# Patient Record
Sex: MALE | Race: WHITE | NOT HISPANIC OR LATINO | Employment: OTHER | URBAN - METROPOLITAN AREA
[De-identification: names, ages, dates, MRNs, and addresses within clinical notes are randomized per-mention and may not be internally consistent; named-entity substitution may affect disease eponyms.]

---

## 2017-05-18 DIAGNOSIS — R91.1 PULMONARY NODULE: Primary | ICD-10-CM

## 2017-05-22 ENCOUNTER — HOSPITAL ENCOUNTER (OUTPATIENT)
Dept: RADIOLOGY | Facility: HOSPITAL | Age: 67
Discharge: HOME | End: 2017-05-22
Attending: NURSE PRACTITIONER
Payer: MEDICARE

## 2017-05-22 DIAGNOSIS — R91.1 PULMONARY NODULE: ICD-10-CM

## 2017-05-22 PROCEDURE — 78815 PET IMAGE W/CT SKULL-THIGH: CPT | Mod: 26,PI,, | Performed by: RADIOLOGY

## 2017-05-22 PROCEDURE — A9552 F18 FDG: HCPCS

## 2021-08-25 ENCOUNTER — HOSPITAL ENCOUNTER (EMERGENCY)
Facility: HOSPITAL | Age: 71
Discharge: HOME | End: 2021-08-25
Attending: EMERGENCY MEDICINE
Payer: MEDICARE

## 2021-08-25 VITALS
BODY MASS INDEX: 26.07 KG/M2 | RESPIRATION RATE: 19 BRPM | WEIGHT: 172 LBS | HEIGHT: 68 IN | OXYGEN SATURATION: 97 % | DIASTOLIC BLOOD PRESSURE: 65 MMHG | HEART RATE: 57 BPM | SYSTOLIC BLOOD PRESSURE: 121 MMHG | TEMPERATURE: 99 F

## 2021-08-25 DIAGNOSIS — M54.2 NECK PAIN: ICD-10-CM

## 2021-08-25 DIAGNOSIS — M54.9 BACK PAIN: ICD-10-CM

## 2021-08-25 DIAGNOSIS — M25.551 RIGHT HIP PAIN: ICD-10-CM

## 2021-08-25 DIAGNOSIS — R07.89 CHEST WALL PAIN: ICD-10-CM

## 2021-08-25 DIAGNOSIS — S00.93XA CONTUSION OF HEAD, UNSPECIFIED PART OF HEAD, INITIAL ENCOUNTER: Primary | ICD-10-CM

## 2021-08-25 DIAGNOSIS — M25.511 RIGHT SHOULDER PAIN: ICD-10-CM

## 2021-08-25 PROCEDURE — 25000003 PHARM REV CODE 250: Performed by: EMERGENCY MEDICINE

## 2021-08-25 PROCEDURE — 99284 EMERGENCY DEPT VISIT MOD MDM: CPT

## 2021-08-25 RX ORDER — HYDROCODONE BITARTRATE AND ACETAMINOPHEN 10; 325 MG/1; MG/1
1 TABLET ORAL
Status: COMPLETED | OUTPATIENT
Start: 2021-08-25 | End: 2021-08-25

## 2021-08-25 RX ORDER — LIDOCAINE 50 MG/G
1 PATCH TOPICAL DAILY
Qty: 5 PATCH | Refills: 0 | Status: SHIPPED | OUTPATIENT
Start: 2021-08-25 | End: 2023-10-02

## 2021-08-25 RX ORDER — METHOCARBAMOL 750 MG/1
1500 TABLET, FILM COATED ORAL 3 TIMES DAILY
Qty: 30 TABLET | Refills: 0 | Status: SHIPPED | OUTPATIENT
Start: 2021-08-25 | End: 2021-08-30

## 2021-08-25 RX ORDER — LIDOCAINE 50 MG/G
1 PATCH TOPICAL
Status: DISCONTINUED | OUTPATIENT
Start: 2021-08-25 | End: 2021-08-25 | Stop reason: HOSPADM

## 2021-08-25 RX ORDER — AZITHROMYCIN 250 MG/1
250 TABLET, FILM COATED ORAL DAILY
Qty: 6 TABLET | Refills: 0 | Status: SHIPPED | OUTPATIENT
Start: 2021-08-25 | End: 2023-10-02

## 2021-08-25 RX ORDER — METHOCARBAMOL 500 MG/1
1000 TABLET, FILM COATED ORAL
Status: COMPLETED | OUTPATIENT
Start: 2021-08-25 | End: 2021-08-25

## 2021-08-25 RX ADMIN — LIDOCAINE 1 PATCH: 50 PATCH TOPICAL at 10:08

## 2021-08-25 RX ADMIN — METHOCARBAMOL TABLETS 1000 MG: 500 TABLET, COATED ORAL at 10:08

## 2021-08-25 RX ADMIN — HYDROCODONE BITARTRATE AND ACETAMINOPHEN 1 TABLET: 10; 325 TABLET ORAL at 10:08

## 2023-10-02 ENCOUNTER — PATIENT MESSAGE (OUTPATIENT)
Dept: NEUROLOGY | Facility: CLINIC | Age: 73
End: 2023-10-02
Payer: MEDICARE

## 2023-10-02 ENCOUNTER — OFFICE VISIT (OUTPATIENT)
Dept: NEUROLOGY | Facility: CLINIC | Age: 73
End: 2023-10-02
Payer: MEDICARE

## 2023-10-02 VITALS
SYSTOLIC BLOOD PRESSURE: 125 MMHG | HEART RATE: 71 BPM | BODY MASS INDEX: 27 KG/M2 | HEIGHT: 67 IN | DIASTOLIC BLOOD PRESSURE: 67 MMHG | WEIGHT: 172 LBS

## 2023-10-02 DIAGNOSIS — G24.3 CERVICAL DYSTONIA: Primary | ICD-10-CM

## 2023-10-02 DIAGNOSIS — G25.0 BENIGN HEAD TREMOR: ICD-10-CM

## 2023-10-02 PROCEDURE — 3008F BODY MASS INDEX DOCD: CPT | Mod: CPTII,S$GLB,, | Performed by: STUDENT IN AN ORGANIZED HEALTH CARE EDUCATION/TRAINING PROGRAM

## 2023-10-02 PROCEDURE — 99999 PR PBB SHADOW E&M-EST. PATIENT-LVL III: ICD-10-PCS | Mod: PBBFAC,,, | Performed by: STUDENT IN AN ORGANIZED HEALTH CARE EDUCATION/TRAINING PROGRAM

## 2023-10-02 PROCEDURE — 3074F SYST BP LT 130 MM HG: CPT | Mod: CPTII,S$GLB,, | Performed by: STUDENT IN AN ORGANIZED HEALTH CARE EDUCATION/TRAINING PROGRAM

## 2023-10-02 PROCEDURE — 3074F PR MOST RECENT SYSTOLIC BLOOD PRESSURE < 130 MM HG: ICD-10-PCS | Mod: CPTII,S$GLB,, | Performed by: STUDENT IN AN ORGANIZED HEALTH CARE EDUCATION/TRAINING PROGRAM

## 2023-10-02 PROCEDURE — 99999 PR PBB SHADOW E&M-EST. PATIENT-LVL III: CPT | Mod: PBBFAC,,, | Performed by: STUDENT IN AN ORGANIZED HEALTH CARE EDUCATION/TRAINING PROGRAM

## 2023-10-02 PROCEDURE — 1159F PR MEDICATION LIST DOCUMENTED IN MEDICAL RECORD: ICD-10-PCS | Mod: CPTII,S$GLB,, | Performed by: STUDENT IN AN ORGANIZED HEALTH CARE EDUCATION/TRAINING PROGRAM

## 2023-10-02 PROCEDURE — 3078F PR MOST RECENT DIASTOLIC BLOOD PRESSURE < 80 MM HG: ICD-10-PCS | Mod: CPTII,S$GLB,, | Performed by: STUDENT IN AN ORGANIZED HEALTH CARE EDUCATION/TRAINING PROGRAM

## 2023-10-02 PROCEDURE — 3008F PR BODY MASS INDEX (BMI) DOCUMENTED: ICD-10-PCS | Mod: CPTII,S$GLB,, | Performed by: STUDENT IN AN ORGANIZED HEALTH CARE EDUCATION/TRAINING PROGRAM

## 2023-10-02 PROCEDURE — 1126F AMNT PAIN NOTED NONE PRSNT: CPT | Mod: CPTII,S$GLB,, | Performed by: STUDENT IN AN ORGANIZED HEALTH CARE EDUCATION/TRAINING PROGRAM

## 2023-10-02 PROCEDURE — 1126F PR PAIN SEVERITY QUANTIFIED, NO PAIN PRESENT: ICD-10-PCS | Mod: CPTII,S$GLB,, | Performed by: STUDENT IN AN ORGANIZED HEALTH CARE EDUCATION/TRAINING PROGRAM

## 2023-10-02 PROCEDURE — 99205 OFFICE O/P NEW HI 60 MIN: CPT | Mod: S$GLB,,, | Performed by: STUDENT IN AN ORGANIZED HEALTH CARE EDUCATION/TRAINING PROGRAM

## 2023-10-02 PROCEDURE — 3078F DIAST BP <80 MM HG: CPT | Mod: CPTII,S$GLB,, | Performed by: STUDENT IN AN ORGANIZED HEALTH CARE EDUCATION/TRAINING PROGRAM

## 2023-10-02 PROCEDURE — 99205 PR OFFICE/OUTPT VISIT, NEW, LEVL V, 60-74 MIN: ICD-10-PCS | Mod: S$GLB,,, | Performed by: STUDENT IN AN ORGANIZED HEALTH CARE EDUCATION/TRAINING PROGRAM

## 2023-10-02 PROCEDURE — 1159F MED LIST DOCD IN RCRD: CPT | Mod: CPTII,S$GLB,, | Performed by: STUDENT IN AN ORGANIZED HEALTH CARE EDUCATION/TRAINING PROGRAM

## 2023-10-02 RX ORDER — METHOCARBAMOL 750 MG/1
1500 TABLET, FILM COATED ORAL 3 TIMES DAILY
COMMUNITY

## 2023-10-02 NOTE — PATIENT INSTRUCTIONS
1) Try to get your records with your injection pattern from prior neurologist at [facility]. Also, prior medications could be helpful.

## 2023-10-02 NOTE — PROGRESS NOTES
Name: Gerald Tiwari Jr.  MRN: [id number]   CSN: [id number]      Date: 10/02/2023    Referring physician:  Miguel Hansen MD  [address]  [address]  [place] [address]    Chief Complaint / Interval History: Cervical Dystonia     History of Present Illness (HPI):    Mr. Tiwari is a 71 yo man who presents to establish care for cervical dystonia and head tremor. His symptoms began several years ago (>25-30 years) with head tremor. Applying pressure to his forehead does slow the tremor. . Over time there began to have more positional component and tightness in his neck/shoulders which responded well to botox at one point. Botox became more difficult to get due to insurance issues. He has since been seeing Dr. Hansen who has referred him to me as he has tried and failed several oral medications to treat his symptoms. He has even been seen by pain mgmt which was not effective. His gait is slightly off balance and he does report some mild word finding difficulty.     Of note, he did have cervical spine surgery (both anteriorly and posteriorly) prior to the onset of his symptoms but the symptom onset was years later. He reports full ROM at the next following his surgery. He has done a ton of PT however it only seems to exacerbate his symptoms. He has not had dry needing. Massage helps but has not been covered by insurance. He gets some relief with high dose robaxin and THC. When trying to sleep at night he will sometimes resort to ETOH as this does slow the tremor. No current or prior neuroleptic usage.     Current Mvmt Medications:  Metoprolol   Robaxin 750mg 2 tabs TID - this does help some, 3 tabs helped more but more groggy      Prior Mvmt Medication Trials:  Baclofen 10mg   Valium   Mysoline   He will get me a full list of medications tried     Past Medical History: The patient  has a past medical history of Aortic dissection distal to left subclavian, Back pain, Benign essential hypertension (6/6/2014),  Chronic pain, Coronary artery disease, CVA (cerebral infarction) (06/04/2014), Elevated cholesterol, Hernia, CABG ( ), Hypertension, Neck pain, Stroke, and Tremors of nervous system. Cervical spine both anteriorly/posteriorly -- 20 years     Relevant Surgical History:   Past Surgical History:   Procedure Laterality Date    ABDOMINAL SURGERY      spleenectomy    arm surgery      BACK SURGERY      LEG SURGERY      NECK SURGERY      SHOULDER SURGERY         Social History: The patient  reports that he has quit smoking. He does not have any smokeless tobacco history on file. He reports current alcohol use. He reports current drug use. Drug: Marijuana.    Family History: Adopted - no children with dystonia or tremor    Allergies: Patient has no known allergies.     Meds:   Current Outpatient Medications on File Prior to Visit   Medication Sig Dispense Refill    folic acid 0.8 mg Cap Take by mouth.      methocarbamoL (ROBAXIN) 750 MG Tab Take 1,500 mg by mouth 3 (three) times daily.      ranolazine (RANEXA) 500 MG Tb12 Take 1 tablet (500 mg total) by mouth 2 (two) times daily. 180 tablet 3    [DISCONTINUED] aspirin 325 MG tablet Take 325 mg by mouth once daily.      [DISCONTINUED] atorvastatin (LIPITOR) 20 MG tablet Take 1 tablet (20 mg total) by mouth once daily. 90 tablet 3    [DISCONTINUED] azithromycin (Z-DARRICK) 250 MG tablet Take 1 tablet (250 mg total) by mouth once daily. Take first 2 tablets together, then 1 every day until finished. 6 tablet 0    [DISCONTINUED] clopidogrel (PLAVIX) 75 mg tablet Take 1 tablet (75 mg total) by mouth once daily. 90 tablet 4    [DISCONTINUED] famotidine (PEPCID) 20 MG tablet Take 1 tablet (20 mg total) by mouth 2 (two) times daily. 180 tablet 3    [DISCONTINUED] LIDOcaine (LIDODERM) 5 % Place 1 patch onto the skin once daily. Remove & Discard patch within 12 hours or as directed by MD 5 patch 0    [DISCONTINUED] losartan (COZAAR) 100 MG tablet TAKE 1 TABLET (100 MG TOTAL) BY MOUTH ONCE  "DAILY. 90 tablet 0    [DISCONTINUED] metoprolol tartrate (LOPRESSOR) 50 MG tablet Take 1 tablet (50 mg total) by mouth 2 (two) times daily. 180 tablet 3    [DISCONTINUED] oxycodone-acetaminophen (PERCOCET)  mg per tablet        No current facility-administered medications on file prior to visit.       Exam:  /67   Pulse 71   Ht 5' 7" (1.702 m)   Wt 78 kg (172 lb)   BMI 26.94 kg/m²     Constitutional  Well-developed, well-nourished, appears stated age   Cardiovascular  No LE edema bilaterally   Neurological    * Mental status  MOCA = not done during today's visit     - Orientation  Oriented to conversation     - Memory   Intact recent and remote     - Attention/concentration  Attentive, vigilant during exam     - Language  Intact to conversation.     - Fund of knowledge  Aware of current events     - Executive  Well-organized thoughts     - Other     * Cranial nerves       - CN II  Pupils equal, visual fields full to confrontation     - CN III, IV, VI  Extraocular movements full, normal pursuits and saccades         - CN VII  Face strong and symmetric bilaterally     - CN VIII  Hearing intact bilaterally         - CN XI  SCM and trapezius 5/5 bilaterally       * Motor  Muscle bulk normal, strength 5/5 throughout   * Sensory   Intact to light touch throughout   * Coordination  No dysmetria with finger-to-nose   * Gait  See below.   * Deep tendon reflexes  2+ and symmetric throughout   * Specialized movement exam Gen: normal facial expression   Speech: normal  Tremor: there is a no-no head tremor that responds to sensory trick, no tremor elsewhere  Bradykinesia: none  Tone: normal   Gait: slightly dystonic, no parkinsonism     Holds head tilted down and to the right. There is hypertrophy of the left SCM       Medical Record Review:  Labs, imaging and prior notes reviewed independently.     Diagnoses:          1. Cervical dystonia  Prior authorization Order      2. Benign head tremor      "       Assessment:  Mr. Tiwari is a 72 with 20-35 years of head tremor and 10 years of cervical dystonia who presents for consideration of BTX treatment as this has been effective for him in the past. He has tried and failed several different PO options and has not benefited from referral to pain mgmt. On his exam he has a no -no head tremor as well as a rightward tilt in his head. His left SCM muscle is atrophied and he is diffusely tender to touch in his neck musculature. He would benefit greatly from botox injections to correct his posture and improve his pain and quality of life.     Plan:  - I have requested Botox 300 units for cervical dystonia.    - Discussed risks of THC and Robaxin combination.   - Monitor ETOH use. Hopeful that with getting him better tremor and pain control he will not need to resort to ETOH.   - Continue stretching exercises as tolerated.     Next visit in [place] for BTX injections.     Total time: 62 minutes spent on the encounter, which includes face to face time and non-face to face time preparing to see the patient (eg, review of tests), Obtaining and/or reviewing separately obtained history, Documenting clinical information in the electronic or other health record, Independently interpreting results (not separately reported) and communicating results to the patient/family/caregiver, or Care coordination (not separately reported).     Lidia Underwood MD  Division of Movement and Memory Disorders  [facility]  [phone]

## 2023-10-03 ENCOUNTER — PATIENT MESSAGE (OUTPATIENT)
Dept: NEUROLOGY | Facility: CLINIC | Age: 73
End: 2023-10-03
Payer: MEDICARE

## 2023-10-13 ENCOUNTER — PATIENT MESSAGE (OUTPATIENT)
Dept: NEUROLOGY | Facility: CLINIC | Age: 73
End: 2023-10-13
Payer: MEDICARE

## 2023-10-18 NOTE — TELEPHONE ENCOUNTER
Prior Botox Injection Pattern     Performed by Dr. Miguel Gutierrez    Right Splenius Capitus 60 units (split into 2 injections)  Right Levator Scapula 40 units (split into 2 injections)  Right Longissimus 25 units  Right Rhomboid 15 units   Trapezius 40 units on the right and 60 units on the left     200 units total     Reportedly had a great response to this without side effects.

## 2023-11-16 ENCOUNTER — PROCEDURE VISIT (OUTPATIENT)
Dept: NEUROLOGY | Facility: CLINIC | Age: 73
End: 2023-11-16
Payer: MEDICARE

## 2023-11-16 DIAGNOSIS — G24.3 CERVICAL DYSTONIA: Primary | ICD-10-CM

## 2023-11-16 PROCEDURE — 64616 PR CHEMODENERVATION NECK MUSCLES EXC LARNYNX, UNI: ICD-10-PCS | Mod: 50,S$GLB,, | Performed by: STUDENT IN AN ORGANIZED HEALTH CARE EDUCATION/TRAINING PROGRAM

## 2023-11-16 PROCEDURE — 64616 CHEMODENERV MUSC NECK DYSTON: CPT | Mod: 50,S$GLB,, | Performed by: STUDENT IN AN ORGANIZED HEALTH CARE EDUCATION/TRAINING PROGRAM

## 2023-11-16 NOTE — PROCEDURES
BOTOX PROCEDURE NOTE     Date of Procedure: 11/16/2023    Reason for Procedure: Cervical Dystonia    Prior Injection Pattern:  R Splenius capitus - 30 units x 2 (60 units)  R Levator Scapula - 20 units x 2 (40 units)  R Longissimus - 25 units  R Rhomboid -15 units  Trapezius - 10 units x 4 on the right and 10 units x 2 on the left (60 units)  200 units total - patient reported short duration of action and felt under medicated.   300 units ordered for today.     Informed consent was obtained prior to performing this study. Two patient identifiers were confirmed with the patient prior to performing this study. A time out to determine correct patient and and agreement on procedure performed was conducted prior to the injections.     Procedure Details: After informed consent obtained the patient's neck was cleansed with alcohol rub and 300 Units of Botox (diluted 1:1) was injected in the following muscles:     Muscle Left Right   Semispinalis Capitis 30 (over 2 sites) 30 (over 2 sites)   Rhomboid 10 10   Trapezius 40 (over 4 sites) 40 (over 4 sites)   Splenius capitus     Longissimus 15 20   Levator Scapulae 40 40   Scalene     TOTAL 135 140     Total = 275 units  Wasted = 25 units     The patient tolerated the procedure well with no more than 1cc of blood loss. He was observed for several minutes post injection and given a handout from [facility] regarding when and where to seek help if side effects are experienced.    RTC in 6 weeks virtually.     Lidia Underwood MD  Neurology

## 2024-01-02 ENCOUNTER — OFFICE VISIT (OUTPATIENT)
Dept: NEUROLOGY | Facility: CLINIC | Age: 74
End: 2024-01-02
Payer: MEDICARE

## 2024-01-02 DIAGNOSIS — G25.0 BENIGN HEAD TREMOR: ICD-10-CM

## 2024-01-02 DIAGNOSIS — G24.3 CERVICAL DYSTONIA: Primary | ICD-10-CM

## 2024-01-02 PROCEDURE — 99215 OFFICE O/P EST HI 40 MIN: CPT | Mod: 95,,, | Performed by: STUDENT IN AN ORGANIZED HEALTH CARE EDUCATION/TRAINING PROGRAM

## 2024-01-02 NOTE — PROGRESS NOTES
Name: Gerald Tiwari Jr.  MRN: [id number]   [facility]: [id number]      Date: 01/02/2024    Chief Complaint / Interval History: Cervical Dystonia     History of Present Illness (HPI):    Mr. Tiwari is a 71 yo man who presents to establish care for cervical dystonia and head tremor. His symptoms began several years ago (>25-30 years) with head tremor. Applying pressure to his forehead does slow the tremor. . Over time there began to have more positional component and tightness in his neck/shoulders which responded well to botox at one point. Botox became more difficult to get due to insurance issues. He has since been seeing Dr. Hansen who has referred him to me as he has tried and failed several oral medications to treat his symptoms. He has even been seen by pain mgmt which was not effective. His gait is slightly off balance and he does report some mild word finding difficulty.     Of note, he did have cervical spine surgery (both anteriorly and posteriorly) prior to the onset of his symptoms but the symptom onset was years later. He reports full ROM at the next following his surgery. He has done a ton of PT however it only seems to exacerbate his symptoms. He has not had dry needing. Massage helps but has not been covered by insurance. He gets some relief with high dose robaxin and THC. When trying to sleep at night he will sometimes resort to ETOH as this does slow the tremor. No current or prior neuroleptic usage.     Interval History:  Mr. Tiwari presents virtually as a post-botox visit to assess how the botox has been helpful for his dystonia. He reports mixed results thus far.  Botox has helped his head tremor the most (reports 90% improvement). He has had 60-75% improvement in his right neck pain but continues to have constant left sided neck pain and limited ROM. No side effects. Overall his QOL has improved and he would like to continue BTX tx.     Current Mvmt Medications:  Metoprolol   Robaxin 750mg 2 tabs BID  (reduced from TID)    Prior Mvmt Medication Trials:  Baclofen 10mg   Valium   Mysoline   Cyclobenzaprine  Gabapentin  Trazodone  Baclofen  Tizanidine  duloxetine  diazepam    Past Medical History: The patient  has a past medical history of Aortic dissection distal to left subclavian, Back pain, Benign essential hypertension (6/6/2014), Chronic pain, Coronary artery disease, CVA (cerebral infarction) (06/04/2014), Elevated cholesterol, Hernia, CABG ( ), Hypertension, Neck pain, Stroke, and Tremors of nervous system. Cervical spine both anteriorly/posteriorly -- 20 years     Relevant Surgical History:   Past Surgical History:   Procedure Laterality Date    ABDOMINAL SURGERY      spleenectomy    arm surgery      BACK SURGERY      LEG SURGERY      NECK SURGERY      SHOULDER SURGERY         Social History: The patient  reports that he has quit smoking. He does not have any smokeless tobacco history on file. He reports current alcohol use. He reports current drug use. Drug: Marijuana.    Family History: Adopted - no children with dystonia or tremor    Allergies: Patient has no known allergies.     Meds:   Current Outpatient Medications on File Prior to Visit   Medication Sig Dispense Refill    folic acid 0.8 mg Cap Take by mouth.      methocarbamoL (ROBAXIN) 750 MG Tab Take 1,500 mg by mouth 3 (three) times daily.      ranolazine (RANEXA) 500 MG Tb12 Take 1 tablet (500 mg total) by mouth 2 (two) times daily. 180 tablet 3     No current facility-administered medications on file prior to visit.       Exam:  Virtual Visit  Head tilted some towards the right, very mild no-no head tremor.   Speech is normal.     Medical Record Review:  Labs, imaging and prior notes reviewed independently.     Diagnoses:          1. Cervical dystonia  DISCONTINUED: onabotulinumtoxina injection 300 Units    CANCELED: Prior authorization Order      2. Benign head tremor              Assessment:  Mr. Tiwari is a 73 with 20-35 years of head tremor  and 10 years of cervical dystonia who presented to me  for consideration of BTX treatment as this has been effective for him in the past. He has tried and failed several different PO options and has not benefited from referral to pain mgmt. On his initial exam he has a no -no head tremor as well as a rightward tilt in his head. His left SCM muscle is hypertrophied and he is diffusely tender to touch in his neck musculature. He has improved significantly with BTX therapy but continues to have a lot of left neck pain and limited ROM and would benefit from increased dosage on the left.     Plan:  - I have requested Botox 300 units for cervical dystonia. Will try to even out the left and right. Need to add more to the left longissimus and possible the SCM?   - Discussed risks of THC and Robaxin combination.   - Monitor ETOH use.   - Continue stretching exercises as tolerated.     Next visit in [place] for BTX injections in mid-Feb.      Total time: 49 minutes spent on the encounter, which includes face to face time and non-face to face time preparing to see the patient (eg, review of tests), Obtaining and/or reviewing separately obtained history, Documenting clinical information in the electronic or other health record, Independently interpreting results (not separately reported) and communicating results to the patient/family/caregiver, or Care coordination (not separately reported).     Lidia Underwood MD  Division of Movement and Memory Disorders  [facility]  [phone]

## 2024-02-21 ENCOUNTER — PROCEDURE VISIT (OUTPATIENT)
Dept: NEUROLOGY | Facility: CLINIC | Age: 74
End: 2024-02-21
Payer: MEDICARE

## 2024-02-21 DIAGNOSIS — G25.0 BENIGN HEAD TREMOR: ICD-10-CM

## 2024-02-21 DIAGNOSIS — G24.3 CERVICAL DYSTONIA: Primary | ICD-10-CM

## 2024-02-21 PROCEDURE — 64616 CHEMODENERV MUSC NECK DYSTON: CPT | Mod: 50,S$GLB,, | Performed by: STUDENT IN AN ORGANIZED HEALTH CARE EDUCATION/TRAINING PROGRAM

## 2024-02-21 PROCEDURE — 64647 CHEMODENERV TRUNK MUSC 6/>: CPT | Mod: 51,S$GLB,, | Performed by: STUDENT IN AN ORGANIZED HEALTH CARE EDUCATION/TRAINING PROGRAM

## 2024-02-21 NOTE — PROCEDURES
BOTOX PROCEDURE NOTE     Date of Procedure: 2/21/2024    Reason for Procedure: Cervical Dystonia    Initial  Injection Pattern From Prior Provider:  R Splenius capitus - 30 units x 2 (60 units)  R Levator Scapula - 20 units x 2 (40 units)  R Longissimus - 25 units  R Rhomboid -15 units  Trapezius - 10 units x   4 on the right and 10 units x 2 on the left (60 units)  200 units total - patient reported short duration of action and felt under medicated.   300 units ordered for today.     Informed consent was obtained prior to performing this study. Two patient identifiers were confirmed with the patient prior to performing this study. A time out to determine correct patient and and agreement on procedure performed was conducted prior to the injections.     Procedure Details: After informed consent obtained the patient's neck was cleansed with alcohol rub and 300 Units of Botox (diluted 1:1) was injected in the following muscles:     Muscle Left Right   Semispinalis Capitis  10   Rhomboid 10 10   Trapezius 40 (over 4 sites) 40 (over 4 sites)   Splenius capitus 30 (over 2 sites) 30 (over 2 sites)   Longissimus 20 20   Levator Scapulae 40 40    Capitus  5 5   TOTAL 145 155     Total = 300 units  Wasted = 0 units     The patient tolerated the procedure well with no more than 1cc of blood loss. He was observed for several minutes post injection and given a handout from [employer] regarding when and where to seek help if side effects are experienced.    RTC in 3 months for additional botox treatment.     Lidia Underwood MD  Neurology

## 2024-05-22 ENCOUNTER — PATIENT MESSAGE (OUTPATIENT)
Dept: NEUROLOGY | Facility: CLINIC | Age: 74
End: 2024-05-22
Payer: MEDICARE

## 2024-05-30 ENCOUNTER — PROCEDURE VISIT (OUTPATIENT)
Dept: NEUROLOGY | Facility: CLINIC | Age: 74
End: 2024-05-30
Payer: MEDICARE

## 2024-05-30 DIAGNOSIS — M25.511 ACUTE PAIN OF RIGHT SHOULDER: Primary | ICD-10-CM

## 2024-05-30 DIAGNOSIS — G24.3 CERVICAL DYSTONIA: ICD-10-CM

## 2024-05-30 PROCEDURE — 64646 CHEMODENERV TRUNK MUSC 1-5: CPT | Mod: 51,S$GLB,, | Performed by: STUDENT IN AN ORGANIZED HEALTH CARE EDUCATION/TRAINING PROGRAM

## 2024-05-30 PROCEDURE — 64616 CHEMODENERV MUSC NECK DYSTON: CPT | Mod: 50,S$GLB,, | Performed by: STUDENT IN AN ORGANIZED HEALTH CARE EDUCATION/TRAINING PROGRAM

## 2024-05-30 NOTE — PROCEDURES
BOTOX PROCEDURE NOTE     Date of Procedure: 5/30/2024    Reason for Procedure: Cervical Dystonia    Botox continues to be very effective for tremor and pain control. Patient desires to continue therapy.     Initial  Injection Pattern From Prior Provider:  R Splenius capitus - 30 units x 2 (60 units)  R Levator Scapula - 20 units x 2 (40 units)  R Longissimus - 25 units  R Rhomboid -15 units  Trapezius - 10 units x   4 on the right and 10 units x 2 on the left (60 units)  200 units total - patient reported short duration of action and felt under medicated.   300 units ordered for today.     Informed consent was obtained prior to performing this study. Two patient identifiers were confirmed with the patient prior to performing this study. A time out to determine correct patient and and agreement on procedure performed was conducted prior to the injections.     Procedure Details: After informed consent obtained the patient's neck was cleansed with alcohol rub and 300 Units of Botox (diluted 1:1) was injected in the following muscles:     Muscle Left Right   Semispinalis Capitis 5 5   Rhomboid 15 5   Trapezius 40 (over 4 sites) 40 (over 4 sites)   Splenius capitus 30 (over 2 sites) 30 (over 2 sites)   Longissimus 30 20   Levator Scapulae 40 40        TOTAL 155 145     Total = 300 units  Wasted = 0 units     The patient tolerated the procedure well with no more than 1cc of blood loss. He was observed for several minutes post injection and given a handout from [facility] regarding when and where to seek help if side effects are experienced.    RTC in 3 months for additional botox treatment.     Lidia Underwood MD  Neurology

## 2024-06-04 ENCOUNTER — OFFICE VISIT (OUTPATIENT)
Dept: SPORTS MEDICINE | Facility: CLINIC | Age: 74
End: 2024-06-04
Payer: MEDICARE

## 2024-06-04 ENCOUNTER — HOSPITAL ENCOUNTER (OUTPATIENT)
Dept: RADIOLOGY | Facility: HOSPITAL | Age: 74
Discharge: HOME | End: 2024-06-04
Attending: PHYSICIAN ASSISTANT
Payer: MEDICARE

## 2024-06-04 VITALS
WEIGHT: 176.38 LBS | DIASTOLIC BLOOD PRESSURE: 58 MMHG | BODY MASS INDEX: 27.62 KG/M2 | HEART RATE: 75 BPM | SYSTOLIC BLOOD PRESSURE: 87 MMHG

## 2024-06-04 DIAGNOSIS — M75.121 NONTRAUMATIC COMPLETE TEAR OF RIGHT ROTATOR CUFF: ICD-10-CM

## 2024-06-04 DIAGNOSIS — S46.211A STRAIN OF RIGHT BICEPS, INITIAL ENCOUNTER: ICD-10-CM

## 2024-06-04 DIAGNOSIS — M25.511 ACUTE PAIN OF RIGHT SHOULDER: Primary | ICD-10-CM

## 2024-06-04 DIAGNOSIS — M25.511 CHRONIC RIGHT SHOULDER PAIN: ICD-10-CM

## 2024-06-04 DIAGNOSIS — G89.29 CHRONIC RIGHT SHOULDER PAIN: ICD-10-CM

## 2024-06-04 PROCEDURE — 73030 X-RAY EXAM OF SHOULDER: CPT | Mod: TC,RT

## 2024-06-04 PROCEDURE — 3008F BODY MASS INDEX DOCD: CPT | Mod: CPTII,S$GLB,, | Performed by: PHYSICIAN ASSISTANT

## 2024-06-04 PROCEDURE — 1159F MED LIST DOCD IN RCRD: CPT | Mod: CPTII,S$GLB,, | Performed by: PHYSICIAN ASSISTANT

## 2024-06-04 PROCEDURE — 73030 X-RAY EXAM OF SHOULDER: CPT | Mod: 26,RT,, | Performed by: RADIOLOGY

## 2024-06-04 PROCEDURE — 3074F SYST BP LT 130 MM HG: CPT | Mod: CPTII,S$GLB,, | Performed by: PHYSICIAN ASSISTANT

## 2024-06-04 PROCEDURE — 3078F DIAST BP <80 MM HG: CPT | Mod: CPTII,S$GLB,, | Performed by: PHYSICIAN ASSISTANT

## 2024-06-04 PROCEDURE — 99204 OFFICE O/P NEW MOD 45 MIN: CPT | Mod: S$GLB,,, | Performed by: PHYSICIAN ASSISTANT

## 2024-06-04 PROCEDURE — 99999 PR PBB SHADOW E&M-EST. PATIENT-LVL III: CPT | Mod: PBBFAC,,, | Performed by: PHYSICIAN ASSISTANT

## 2024-06-04 PROCEDURE — 1125F AMNT PAIN NOTED PAIN PRSNT: CPT | Mod: CPTII,S$GLB,, | Performed by: PHYSICIAN ASSISTANT

## 2024-06-04 RX ORDER — POTASSIUM CHLORIDE 750 MG/1
10 CAPSULE, EXTENDED RELEASE ORAL ONCE
COMMUNITY

## 2024-06-04 RX ORDER — METHYLPREDNISOLONE 4 MG/1
TABLET ORAL
Qty: 21 EACH | Refills: 0 | Status: SHIPPED | OUTPATIENT
Start: 2024-06-04 | End: 2024-06-25

## 2024-06-04 RX ORDER — ACETAMINOPHEN 325 MG/1
325 TABLET ORAL EVERY 6 HOURS PRN
COMMUNITY

## 2024-06-04 RX ORDER — CYCLOBENZAPRINE HCL 5 MG
5 TABLET ORAL NIGHTLY
Qty: 20 TABLET | Refills: 0 | Status: SHIPPED | OUTPATIENT
Start: 2024-06-04 | End: 2024-06-24

## 2024-06-04 NOTE — PROGRESS NOTES
CC: RIGHT shoulder pain    Patient is a 73-year-old male with a history of torticollis/cervical dystonia who presents today for initial evaluation of right shoulder pain as a new patient to me.  Previously seen by Dr. Mt De La Rosa for this issue sometime last year.  Ultimately he underwent an MRI last February which showed a retracted full-thickness rotator cuff tear with associated muscle atrophy. Patient reports approximately 2 weeks ago he experienced pain in his right arm/biceps, which occurred during sleep. This resulted in a lump that eventually resolved, but the deep constant ache remained. He mentions a few days after he was digging a ditch outside his home and the pain worsened that evening. Patient states he will get about 2-3 hours of sleep each night and only total rest and no movement of the right arm will help. He is in severe pain while washing dishes, typing emails, or using the TV remote, and relies on his left arm for most activities of daily life. Patient reports taking tylenol to help alleviate the pain. Patient denies any numbness or tingling.  He has also been wearing a wrist splint for a separate wrist injury that he is currently being treated for.    Is affecting ADLs.  Pain is 8/10 at it's worst.      Past Medical History:   Diagnosis Date    Aortic dissection distal to left subclavian     Back pain     Benign essential hypertension 6/6/2014    Chronic pain     Coronary artery disease     CVA (cerebral infarction) 06/04/2014    Elevated cholesterol     Hernia     Hx of CABG      Hypertension     Neck pain     s/p neck surgery    Stroke     Tremors of nervous system        Past Surgical History:   Procedure Laterality Date    ABDOMINAL SURGERY      spleenectomy    arm surgery      BACK SURGERY      LEG SURGERY      NECK SURGERY      SHOULDER SURGERY         No family history on file.      Current Outpatient Medications:     acetaminophen (TYLENOL) 325 MG tablet, Take 325 mg by mouth every 6  (six) hours as needed for Pain., Disp: , Rfl:     folic acid 0.8 mg Cap, Take by mouth., Disp: , Rfl:     potassium chloride (MICRO-K) 10 MEQ CpSR, Take 10 mEq by mouth once., Disp: , Rfl:     ranolazine (RANEXA) 500 MG Tb12, Take 1 tablet (500 mg total) by mouth 2 (two) times daily., Disp: 180 tablet, Rfl: 3    cyclobenzaprine (FLEXERIL) 5 MG tablet, Take 1 tablet (5 mg total) by mouth nightly. for 20 days, Disp: 20 tablet, Rfl: 0    methocarbamoL (ROBAXIN) 750 MG Tab, Take 1,500 mg by mouth 3 (three) times daily., Disp: , Rfl:     methylPREDNISolone (MEDROL DOSEPACK) 4 mg tablet, use as directed, Disp: 21 each, Rfl: 0    Review of patient's allergies indicates:  No Known Allergies       REVIEW OF SYSTEMS:  Constitution: Negative. Negative for chills, fever and night sweats.   HENT: Negative for congestion and headaches.    Eyes: Negative for blurred vision, left vision loss and right vision loss.   Cardiovascular: Negative for chest pain and syncope.   Respiratory: Negative for cough and shortness of breath.    Endocrine: Negative for polydipsia, polyphagia and polyuria.   Hematologic/Lymphatic: Negative for bleeding problem. Does not bruise/bleed easily.   Skin: Negative for dry skin, itching and rash.   Musculoskeletal: Negative for falls.  Positive for right shoulder pain and muscle weakness.   Gastrointestinal: Negative for abdominal pain and bowel incontinence.   Genitourinary: Negative for bladder incontinence and nocturia.   Neurological: Negative for disturbances in coordination, loss of balance and seizures.   Psychiatric/Behavioral: Negative for depression. The patient does not have insomnia.    Allergic/Immunologic: Negative for hives and persistent infections.      PHYSICAL EXAMINATION:  Vitals:  BP (!) 87/58   Pulse 75   Wt 80 kg (176 lb 5.9 oz)   BMI 27.62 kg/m²    General: The patient is alert and oriented x 3.  Mood is pleasant.  Observation of ears, eyes and nose reveal no gross abnormalities.   No labored breathing observed.  Gait is coordinated. Patient can toe walk and heel walk without difficulty.      RIGHT SHOULDER / UPPER EXTREMITY EXAM    OBSERVATION:     Swelling  none  Deformity  none   Discoloration  none   Scapular winging none   Scars   none  Atrophy  moderate    TENDERNESS / CREPITUS (T/C):          T/C      T/C   Clavicle   -/-  SUPRAspinatus    +/-     AC Jt.    +/-  INFRAspinatus  +/-    SC Jt.    -/-  Deltoid    -/-      G. Tuberosity  +/-  LH BICEP groove  +/-   Acromion:  -/-  Midline Neck   -/-     Scapular Spine -/-  Trapezium   -/-   SMA Scapula  -/-  GH jt. line - post  +/-     Scapulothoracic  -/-         ROM: (* = with pain)  Left shoulder   Right shoulder  *majority of exam limited due to pain*       AROM (PROM)   AROM (PROM)   FE    170° (175°)     35°* (60°)     ER at 0°    60°  (65°)    15°*  (40*)   ER at 90° ABD  90°  (90°)    deferred   IR at 90°  ABD   NA  (40°)     deferred     IR (spine level)   T10     deferred    STRENGTH: (* = with pain) Left shoulder   Right shoulder   SCAPTION   5/5    3/5*    IR    5/5    4/5*   ER    5/5    4/5*   BICEPS   5/5    3/5*   Deltoid    5/5    3/5*     SIGNS:  *special testing deferred secondary to pain*    STABILITY TESTING    Left shoulder   Right shoulder    Translation     Anterior  up face     up face    Posterior  up face    up face    Sulcus   < 10mm    < 10 mm     Signs   Apprehension   neg      neg       Relocation   no change     no change      Jerk test  neg     neg    EXTREMITY NEURO-VASCULAR EXAM:    Sensation grossly intact to light touch all dermatomal regions.    DTR 2+ Biceps, Triceps, BR and Negative Chens sign   Grossly intact motor function at Elbow, Wrist and Hand   Distal pulses radial and ulnar 2+, brisk cap refill, symmetric.      NECK:  Painless FROM and spinous processes non-tender. Negative Spurlings sign.      OTHER FINDINGS:  none    MRI right shoulder, external (February 2023):  Impression    1.  Full-thickness tendon tear of the supraspinatus tendon with tendon retraction or muscle atrophy.  2. Mid-high grade partial-thickness tear through the anterior infraspinatus tendon, with mild muscle atrophy.  3. Mild to moderate tendinosis of the subscapularis with partial-thickness tearing/fraying to the cephalad fibers.  4. Moderate to severe osteoarthrosis at the acromioclavicular and glenohumeral joints with elevation of the humeral head.  5. Large subacromial/subdeltoid fluid collection and small to moderate overall shoulder joint effusion.      XRAYS right shoulder (6/4/2024):  Xrays including AP, Outlet and Axillary Lateral of shoulder are ordered / images reviewed by me:  No acute fracture or dislocation.  Moderate glenohumeral and acromioclavicular joint DJD.  Soft tissues appear normal.  Sternotomy wires in place without any visible complication.        ASSESSMENT:     Right shoulder pain  Biceps muscle strain  Chronic full thickness rotator cuff tear, right shoulder  Overuse syndrome of right shoulder        PLAN:      I made the decision to obtain old records of the patient including previous notes and imaging. New imaging was ordered today of the extremity or extremities evaluated. I independently reviewed and interpreted the radiographs and/or MRIs today as well as prior imaging, if available.    Discussed treatment options with patient. Due to past medical history patient is not a good surgical candidate. Will start Flexeril, muscle relaxant and Medrol, oral steroid to help with pain and inflammation. Also mentioned, physical therapy is another option once pain tolerance is under control.  Also advised patient to regularly ice the shoulder to reduce swelling/inflammation.  Continue gentle range-of-motion exercises to avoid stiffness.      Follow up scheduled in 3 weeks to reassess plan of care.       All questions were answered, patient will contact us for questions or concerns in the  interim.      Medical Dictation software was used during the dictation of portions or the entirety of this medical record.  Phonetic or grammatic errors may exist due to the use of this software. For clarification, refer to the author of the document.

## 2024-06-27 ENCOUNTER — PATIENT MESSAGE (OUTPATIENT)
Dept: NEUROLOGY | Facility: CLINIC | Age: 74
End: 2024-06-27
Payer: MEDICARE

## 2024-06-28 ENCOUNTER — OFFICE VISIT (OUTPATIENT)
Dept: SPORTS MEDICINE | Facility: CLINIC | Age: 74
End: 2024-06-28
Payer: MEDICARE

## 2024-06-28 VITALS
DIASTOLIC BLOOD PRESSURE: 68 MMHG | HEART RATE: 75 BPM | SYSTOLIC BLOOD PRESSURE: 105 MMHG | HEIGHT: 67 IN | WEIGHT: 176.38 LBS | BODY MASS INDEX: 27.68 KG/M2
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
## 2024-06-28 DIAGNOSIS — S46.911D OVERUSE SYNDROME OF SHOULDER, RIGHT, SUBSEQUENT ENCOUNTER: ICD-10-CM
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
## 2024-06-28 DIAGNOSIS — M25.512 ACUTE PAIN OF LEFT SHOULDER: ICD-10-CM

## 2024-06-28 DIAGNOSIS — G89.29 CHRONIC RIGHT SHOULDER PAIN: Primary | ICD-10-CM

## 2024-06-28 DIAGNOSIS — M25.511 CHRONIC RIGHT SHOULDER PAIN: Primary | ICD-10-CM

## 2024-06-28 DIAGNOSIS — M19.011 GLENOHUMERAL ARTHRITIS, RIGHT: ICD-10-CM

## 2024-06-28 DIAGNOSIS — M75.121 NONTRAUMATIC COMPLETE TEAR OF RIGHT ROTATOR CUFF: ICD-10-CM

## 2024-06-28 DIAGNOSIS — S46.912A OVERUSE SYNDROME OF SHOULDER, LEFT, INITIAL ENCOUNTER: ICD-10-CM

## 2024-06-28 PROCEDURE — 99999 PR PBB SHADOW E&M-EST. PATIENT-LVL III: CPT | Mod: PBBFAC,,, | Performed by: PHYSICIAN ASSISTANT

## 2024-06-28 RX ORDER — TRIAMCINOLONE ACETONIDE 40 MG/ML
40 INJECTION, SUSPENSION INTRA-ARTICULAR; INTRAMUSCULAR
Status: DISCONTINUED | OUTPATIENT
Start: 2024-06-28 | End: 2024-06-28 | Stop reason: HOSPADM

## 2024-06-28 RX ADMIN — TRIAMCINOLONE ACETONIDE 40 MG: 40 INJECTION, SUSPENSION INTRA-ARTICULAR; INTRAMUSCULAR at 09:06

## 2024-06-28 NOTE — PROCEDURES
Large Joint Aspiration/Injection: bilateral subacromial bursa    Date/Time: 6/28/2024 9:30 AM    Performed by: Bruce Mccray PA-C  Authorized by: Bruce Mccray PA-C    Consent Done?:  Yes (Verbal)  Indications:  Pain  Site marked: the procedure site was marked    Timeout: prior to procedure the correct patient, procedure, and site was verified    Prep: patient was prepped and draped in usual sterile fashion    Local anesthesia used?: No      Details:  Needle Size:  22 G  Ultrasonic Guidance for needle placement?: No    Approach:  Posterior  Location:  Shoulder  Laterality:  Bilateral  Site:  Bilateral subacromial bursa  Medications (Right):  40 mg triamcinolone acetonide 40 mg/mL  Medications (Left):  40 mg triamcinolone acetonide 40 mg/mL  Patient tolerance:  Patient tolerated the procedure well with no immediate complications    Injection Procedure  A time out was performed, including verification of patient ID, procedure, site and side, availability of information and equipment, review of safety issues, and agreement with consent, the procedure site was marked.    After time out was performed, the patient was prepped aseptically with povidone-iodine swabsticks. A diagnostic and therapeutic injection of 1:3cc Kenalog/Marcaine was given under sterile technique using a 22g x 1.5 needle from the Posterior  aspect of the bilateral Subacromial in the sitting position.      Gerald Tiwari Jr. had no adverse reactions to the medication. Pain decreased. He was instructed to apply ice to the joint for 20 minutes and avoid strenuous activities for 24-36 hours following the injection. He was warned of possible blood sugar and/or blood pressure changes during that time. Following that time, he can resume regular activities.    He was reminded to call the clinic immediately for any adverse side effects as explained in clinic today.
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
## 2024-08-30 DIAGNOSIS — R41.3 OTHER AMNESIA: Primary | ICD-10-CM

## 2024-09-06 ENCOUNTER — HOSPITAL ENCOUNTER (OUTPATIENT)
Dept: RADIOLOGY | Facility: HOSPITAL | Age: 74
Discharge: HOME | End: 2024-09-06
Payer: MEDICARE

## 2024-09-06 DIAGNOSIS — R41.3 OTHER AMNESIA: ICD-10-CM

## 2024-09-06 PROCEDURE — 70551 MRI BRAIN STEM W/O DYE: CPT | Mod: TC

## 2024-09-06 PROCEDURE — 70551 MRI BRAIN STEM W/O DYE: CPT | Mod: 26,,, | Performed by: RADIOLOGY

## 2024-09-18 ENCOUNTER — PROCEDURE VISIT (OUTPATIENT)
Dept: NEUROLOGY | Facility: CLINIC | Age: 74
End: 2024-09-18
Payer: MEDICARE

## 2024-09-18 DIAGNOSIS — G24.3 CERVICAL DYSTONIA: Primary | ICD-10-CM

## 2024-09-18 NOTE — PROCEDURES
BOTOX PROCEDURE NOTE     Date of Procedure: 9/18/2024    Reason for Procedure: Cervical Dystonia    Botox continues to be very effective for tremor and pain control. Patient desires to continue therapy with a bit more injected on the left due to some residual pain. No side effects.     Initial  Injection Pattern From Prior Provider:  R Splenius capitus - 30 units x 2 (60 units)  R Levator Scapula - 20 units x 2 (40 units)  R Longissimus - 25 units  R Rhomboid -15 units  Trapezius - 10 units x   4 on the right and 10 units x 2 on the left (60 units)  200 units total - patient reported short duration of action and felt under medicated.   300 units ordered for today.     Informed consent was obtained prior to performing this study. Two patient identifiers were confirmed with the patient prior to performing this study. A time out to determine correct patient and and agreement on procedure performed was conducted prior to the injections.     Procedure Details: After informed consent obtained the patient's neck was cleansed with alcohol rub and 300 Units of Botox (diluted 1:1) was injected in the following muscles:     Muscle Left Right   Semispinalis Capitis 5 5   Rhomboid 15 (over 3 sites) 5   Trapezius 50 (over 5 sites - around incision) 30 (over 3 sites)   Splenius capitus 30 (over 2 sites) 30 (over 2 sites)   Longissimus 30 20   Levator Scapulae 40 40        TOTAL 150 150     Total = 300 units  Wasted = 0 units     The patient tolerated the procedure well with no more than 1cc of blood loss. He was observed for several minutes post injection and given a handout from [employer] regarding when and where to seek help if side effects are experienced.    RTC in 3 months for additional botox treatment.     Lidia Underwood MD  Neurology

## 2024-09-26 ENCOUNTER — PATIENT MESSAGE (OUTPATIENT)
Dept: SPORTS MEDICINE | Facility: CLINIC | Age: 74
End: 2024-09-26
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
## 2024-09-27 ENCOUNTER — OFFICE VISIT (OUTPATIENT)
Dept: SPORTS MEDICINE | Facility: CLINIC | Age: 74
End: 2024-09-27
Payer: MEDICARE

## 2024-09-27 VITALS
DIASTOLIC BLOOD PRESSURE: 79 MMHG | HEART RATE: 56 BPM | SYSTOLIC BLOOD PRESSURE: 102 MMHG | WEIGHT: 176.38 LBS | HEIGHT: 67 IN | BODY MASS INDEX: 27.68 KG/M2

## 2024-09-27 DIAGNOSIS — G89.29 CHRONIC RIGHT SHOULDER PAIN: Primary | ICD-10-CM

## 2024-09-27 DIAGNOSIS — G89.29 CHRONIC LEFT SHOULDER PAIN: ICD-10-CM

## 2024-09-27 DIAGNOSIS — M25.511 CHRONIC RIGHT SHOULDER PAIN: Primary | ICD-10-CM

## 2024-09-27 DIAGNOSIS — M25.512 CHRONIC LEFT SHOULDER PAIN: ICD-10-CM

## 2024-09-27 DIAGNOSIS — M75.121 NONTRAUMATIC COMPLETE TEAR OF RIGHT ROTATOR CUFF: ICD-10-CM

## 2024-09-27 DIAGNOSIS — M19.011 GLENOHUMERAL ARTHRITIS, RIGHT: ICD-10-CM

## 2024-09-27 PROCEDURE — 99999 PR PBB SHADOW E&M-EST. PATIENT-LVL III: CPT | Mod: PBBFAC,,, | Performed by: PHYSICIAN ASSISTANT

## 2024-09-27 RX ORDER — TRIAMCINOLONE ACETONIDE 40 MG/ML
40 INJECTION, SUSPENSION INTRA-ARTICULAR; INTRAMUSCULAR
Status: DISCONTINUED | OUTPATIENT
Start: 2024-09-27 | End: 2024-09-27 | Stop reason: HOSPADM

## 2024-09-27 RX ADMIN — TRIAMCINOLONE ACETONIDE 40 MG: 40 INJECTION, SUSPENSION INTRA-ARTICULAR; INTRAMUSCULAR at 09:09

## 2024-09-27 NOTE — PROCEDURES
Large Joint Aspiration/Injection: bilateral subacromial bursa    Date/Time: 9/27/2024 9:00 AM    Performed by: Bruce Mccray PA-C  Authorized by: Bruce Mccray PA-C    Consent Done?:  Yes (Verbal)  Indications:  Pain  Site marked: the procedure site was marked    Timeout: prior to procedure the correct patient, procedure, and site was verified    Prep: patient was prepped and draped in usual sterile fashion    Local anesthesia used?: No      Details:  Needle Size:  22 G  Ultrasonic Guidance for needle placement?: No    Approach:  Posterior  Location:  Shoulder  Laterality:  Bilateral  Site:  Bilateral subacromial bursa  Medications (Right):  40 mg triamcinolone acetonide 40 mg/mL  Medications (Left):  40 mg triamcinolone acetonide 40 mg/mL  Patient tolerance:  Patient tolerated the procedure well with no immediate complications    Injection Procedure  A time out was performed, including verification of patient ID, procedure, site and side, availability of information and equipment, review of safety issues, and agreement with consent, the procedure site was marked.    After time out was performed, the patient was prepped aseptically with povidone-iodine swabsticks. A diagnostic and therapeutic injection of 1:3cc Kenalog/Marcaine was given under sterile technique using a 22g x 1.5 needle from the Posterior  aspect of the bilateral Subacromial in the sitting position.      Gerald Tiwari Jr. had no adverse reactions to the medication. Pain decreased. He was instructed to apply ice to the joint for 20 minutes and avoid strenuous activities for 24-36 hours following the injection. He was warned of possible blood sugar and/or blood pressure changes during that time. Following that time, he can resume regular activities.    He was reminded to call the clinic immediately for any adverse side effects as explained in clinic today.

## 2024-12-17 ENCOUNTER — OFFICE VISIT (OUTPATIENT)
Dept: SPORTS MEDICINE | Facility: CLINIC | Age: 74
End: 2024-12-17
Payer: MEDICARE

## 2024-12-17 VITALS
HEART RATE: 57 BPM | BODY MASS INDEX: 27.84 KG/M2 | HEIGHT: 67 IN | SYSTOLIC BLOOD PRESSURE: 149 MMHG | WEIGHT: 177.38 LBS | DIASTOLIC BLOOD PRESSURE: 82 MMHG

## 2024-12-17 DIAGNOSIS — G89.29 CHRONIC LEFT SHOULDER PAIN: ICD-10-CM

## 2024-12-17 DIAGNOSIS — G89.29 CHRONIC RIGHT SHOULDER PAIN: Primary | ICD-10-CM

## 2024-12-17 DIAGNOSIS — M25.512 CHRONIC LEFT SHOULDER PAIN: ICD-10-CM

## 2024-12-17 DIAGNOSIS — M25.511 CHRONIC RIGHT SHOULDER PAIN: Primary | ICD-10-CM

## 2024-12-17 PROCEDURE — 1101F PT FALLS ASSESS-DOCD LE1/YR: CPT | Mod: CPTII,S$GLB,, | Performed by: ORTHOPAEDIC SURGERY

## 2024-12-17 PROCEDURE — 3008F BODY MASS INDEX DOCD: CPT | Mod: CPTII,S$GLB,, | Performed by: ORTHOPAEDIC SURGERY

## 2024-12-17 PROCEDURE — 99214 OFFICE O/P EST MOD 30 MIN: CPT | Mod: 25,S$GLB,, | Performed by: ORTHOPAEDIC SURGERY

## 2024-12-17 PROCEDURE — 99999 PR PBB SHADOW E&M-EST. PATIENT-LVL III: CPT | Mod: PBBFAC,,, | Performed by: ORTHOPAEDIC SURGERY

## 2024-12-17 PROCEDURE — 3079F DIAST BP 80-89 MM HG: CPT | Mod: CPTII,S$GLB,, | Performed by: ORTHOPAEDIC SURGERY

## 2024-12-17 PROCEDURE — 1125F AMNT PAIN NOTED PAIN PRSNT: CPT | Mod: CPTII,S$GLB,, | Performed by: ORTHOPAEDIC SURGERY

## 2024-12-17 PROCEDURE — 1159F MED LIST DOCD IN RCRD: CPT | Mod: CPTII,S$GLB,, | Performed by: ORTHOPAEDIC SURGERY

## 2024-12-17 PROCEDURE — 3077F SYST BP >= 140 MM HG: CPT | Mod: CPTII,S$GLB,, | Performed by: ORTHOPAEDIC SURGERY

## 2024-12-17 PROCEDURE — 3288F FALL RISK ASSESSMENT DOCD: CPT | Mod: CPTII,S$GLB,, | Performed by: ORTHOPAEDIC SURGERY

## 2024-12-17 PROCEDURE — 20610 DRAIN/INJ JOINT/BURSA W/O US: CPT | Mod: 50,S$GLB,, | Performed by: ORTHOPAEDIC SURGERY

## 2024-12-17 RX ORDER — TRIAMCINOLONE ACETONIDE 40 MG/ML
40 INJECTION, SUSPENSION INTRA-ARTICULAR; INTRAMUSCULAR
Status: DISCONTINUED | OUTPATIENT
Start: 2024-12-17 | End: 2024-12-17 | Stop reason: HOSPADM

## 2024-12-17 RX ADMIN — TRIAMCINOLONE ACETONIDE 40 MG: 40 INJECTION, SUSPENSION INTRA-ARTICULAR; INTRAMUSCULAR at 10:12

## 2024-12-17 NOTE — PROCEDURES
Large Joint Aspiration/Injection: bilateral subacromial bursa    Date/Time: 12/17/2024 10:15 AM    Performed by: Mt Delvalle MD  Authorized by: Mt Delvalle MD    Consent Done?:  Yes (Verbal)  Indications:  Pain  Site marked: the procedure site was marked    Timeout: prior to procedure the correct patient, procedure, and site was verified    Prep: patient was prepped and draped in usual sterile fashion    Local anesthesia used?: No      Details:  Needle Size:  22 G  Ultrasonic Guidance for needle placement?: No    Approach:  Posterior  Location:  Shoulder  Laterality:  Bilateral  Site:  Bilateral subacromial bursa  Medications (Right):  40 mg triamcinolone acetonide 40 mg/mL  Medications (Left):  40 mg triamcinolone acetonide 40 mg/mL  Patient tolerance:  Patient tolerated the procedure well with no immediate complications
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
## 2025-01-08 ENCOUNTER — PROCEDURE VISIT (OUTPATIENT)
Dept: NEUROLOGY | Facility: CLINIC | Age: 75
End: 2025-01-08
Payer: MEDICARE

## 2025-01-08 DIAGNOSIS — G24.3 CERVICAL DYSTONIA: Primary | ICD-10-CM

## 2025-01-08 PROCEDURE — 64616 CHEMODENERV MUSC NECK DYSTON: CPT | Mod: 50,S$GLB,, | Performed by: STUDENT IN AN ORGANIZED HEALTH CARE EDUCATION/TRAINING PROGRAM

## 2025-01-08 PROCEDURE — 64647 CHEMODENERV TRUNK MUSC 6/>: CPT | Mod: S$GLB,,, | Performed by: STUDENT IN AN ORGANIZED HEALTH CARE EDUCATION/TRAINING PROGRAM

## 2025-01-08 NOTE — PROCEDURES
BOTOX PROCEDURE NOTE     Date of Procedure: 1/8/2025    Reason for Procedure: Cervical Dystonia    Botox continues to be very effective for tremor and pain control. Patient desires to continue therapy with a bit more injected on the left due to some residual pain. No side effects.     No constipation, no anosmia, sometimes active dreams but talking - thinks pain related.     Initial  Injection Pattern From Prior Provider:  R Splenius capitus - 30 units x 2 (60 units)  R Levator Scapula - 20 units x 2 (40 units)  R Longissimus - 25 units  R Rhomboid -15 units  Trapezius - 10 units x   4 on the right and 10 units x 2 on the left (60 units)  200 units total - patient reported short duration of action and felt under medicated.   300 units ordered for today.     Informed consent was obtained prior to performing this study. Two patient identifiers were confirmed with the patient prior to performing this study. A time out to determine correct patient and and agreement on procedure performed was conducted prior to the injections.     Procedure Details: After informed consent obtained the patient's neck was cleansed with alcohol rub and 300 Units of Botox (diluted 1:1) was injected in the following muscles:     Muscle Left Right   Semispinalis Capitis 5 5   Rhomboid 15 (over 3 sites) 5   Trapezius 50 (over 5 sites - around incision) 30 (over 3 sites)   Splenius capitus 30 (over 2 sites) 30 (over 2 sites)   Longissimus 30 20   Levator Scapulae 40 40        TOTAL 150 150     Total = 300 units  Wasted = 0 units     The patient tolerated the procedure well with no more than 1cc of blood loss. He was observed for several minutes post injection and given a handout from [employer] regarding when and where to seek help if side effects are experienced.    RTC in 3 months for additional botox treatment.     Lidia Underwood MD  Neurology

## 2025-04-09 ENCOUNTER — PATIENT MESSAGE (OUTPATIENT)
Dept: SPORTS MEDICINE | Facility: CLINIC | Age: 75
End: 2025-04-09
Payer: MEDICARE

## 2025-04-10 ENCOUNTER — PROCEDURE VISIT (OUTPATIENT)
Dept: NEUROLOGY | Facility: CLINIC | Age: 75
End: 2025-04-10
Payer: MEDICARE

## 2025-04-10 DIAGNOSIS — G24.3 CERVICAL DYSTONIA: Primary | ICD-10-CM

## 2025-04-10 NOTE — PROCEDURES
BOTOX PROCEDURE NOTE     Date of Procedure: 4/10/2025    Reason for Procedure: Cervical Dystonia    Botox continues to be very effective for tremor and pain control. No side effects. Last injection wasn't quite as effective however he was nearly 6 weeks late in receiving the injections so wonders if this was contributing.     No constipation, no anosmia, sometimes active dreams but talking - thinks pain related.     Initial  Injection Pattern From Prior Provider:  R Splenius capitus - 30 units x 2 (60 units)  R Levator Scapula - 20 units x 2 (40 units)  R Longissimus - 25 units  R Rhomboid -15 units  Trapezius - 10 units x   4 on the right and 10 units x 2 on the left (60 units)  200 units total - patient reported short duration of action and felt under medicated.   300 units ordered for today.     Informed consent was obtained prior to performing this study. Two patient identifiers were confirmed with the patient prior to performing this study. A time out to determine correct patient and and agreement on procedure performed was conducted prior to the injections.     Procedure Details: After informed consent obtained the patient's neck was cleansed with alcohol rub and 300 Units of Botox (diluted 1:1) was injected in the following muscles:     Muscle Left Right   Semispinalis Capitis 5 5   Rhomboid 15 (over 3 sites) 5   Trapezius 50 (over 5 sites - around incision) 30 (over 3 sites)   Splenius capitus 30 (over 2 sites) 30 (over 2 sites)   Longissimus 30 20   Levator Scapulae 40 40        TOTAL 150 150       Total = 300 units  Wasted = 0 units     The patient tolerated the procedure well with no more than 1cc of blood loss. He was observed for several minutes post injection and given a handout from [employer] regarding when and where to seek help if side effects are experienced.    RTC in 3 months for additional botox treatment.     Lidia Underwood MD  Neurology

## 2025-04-11 ENCOUNTER — OFFICE VISIT (OUTPATIENT)
Dept: SPORTS MEDICINE | Facility: CLINIC | Age: 75
End: 2025-04-11
Payer: MEDICARE

## 2025-04-11 VITALS
DIASTOLIC BLOOD PRESSURE: 68 MMHG | BODY MASS INDEX: 25.69 KG/M2 | SYSTOLIC BLOOD PRESSURE: 110 MMHG | HEART RATE: 64 BPM | WEIGHT: 164 LBS

## 2025-04-11 DIAGNOSIS — G89.29 CHRONIC LEFT SHOULDER PAIN: ICD-10-CM

## 2025-04-11 DIAGNOSIS — M75.102 ROTATOR CUFF SYNDROME OF LEFT SHOULDER: ICD-10-CM

## 2025-04-11 DIAGNOSIS — G89.29 CHRONIC RIGHT SHOULDER PAIN: Primary | ICD-10-CM

## 2025-04-11 DIAGNOSIS — M19.011 GLENOHUMERAL ARTHRITIS, RIGHT: ICD-10-CM

## 2025-04-11 DIAGNOSIS — M25.512 CHRONIC LEFT SHOULDER PAIN: ICD-10-CM

## 2025-04-11 DIAGNOSIS — M75.121 NONTRAUMATIC COMPLETE TEAR OF RIGHT ROTATOR CUFF: ICD-10-CM

## 2025-04-11 DIAGNOSIS — M25.511 CHRONIC RIGHT SHOULDER PAIN: Primary | ICD-10-CM

## 2025-04-11 PROCEDURE — 99999 PR PBB SHADOW E&M-EST. PATIENT-LVL III: CPT | Mod: PBBFAC,,, | Performed by: PHYSICIAN ASSISTANT

## 2025-04-11 RX ORDER — TRIAMCINOLONE ACETONIDE 40 MG/ML
40 INJECTION, SUSPENSION INTRA-ARTICULAR; INTRAMUSCULAR
Status: DISCONTINUED | OUTPATIENT
Start: 2025-04-11 | End: 2025-04-11 | Stop reason: HOSPADM

## 2025-04-11 RX ADMIN — TRIAMCINOLONE ACETONIDE 40 MG: 40 INJECTION, SUSPENSION INTRA-ARTICULAR; INTRAMUSCULAR at 11:04
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
## 2025-04-11 NOTE — PROCEDURES
Large Joint Aspiration/Injection: bilateral subacromial bursa    Date/Time: 4/11/2025 11:00 AM    Performed by: Bruce Mccray PA-C  Authorized by: Bruce Mccray PA-C    Consent Done?:  Yes (Verbal)  Indications:  Pain  Site marked: the procedure site was marked    Timeout: prior to procedure the correct patient, procedure, and site was verified    Prep: patient was prepped and draped in usual sterile fashion    Local anesthesia used?: No      Details:  Needle Size:  22 G  Ultrasonic Guidance for needle placement?: No    Approach:  Posterior  Location:  Shoulder  Laterality:  Bilateral  Site:  Bilateral subacromial bursa  Medications (Right):  40 mg triamcinolone acetonide 40 mg/mL  Medications (Left):  40 mg triamcinolone acetonide 40 mg/mL  Patient tolerance:  Patient tolerated the procedure well with no immediate complications    Injection Procedure  A time out was performed, including verification of patient ID, procedure, site and side, availability of information and equipment, review of safety issues, and agreement with consent, the procedure site was marked.    After time out was performed, the patient was prepped aseptically with povidone-iodine swabsticks. A diagnostic and therapeutic injection of 1:3cc Kenalog/Marcaine was given under sterile technique using a 22g x 1.5 needle from the Posterior  aspect of the bilateral Subacromial in the sitting position.      Gerald Tiwari Jr. had no adverse reactions to the medication. Pain decreased. He was instructed to apply ice to the joint for 20 minutes and avoid strenuous activities for 24-36 hours following the injection. He was warned of possible blood sugar and/or blood pressure changes during that time. Following that time, he can resume regular activities.    He was reminded to call the clinic immediately for any adverse side effects as explained in clinic today.

## 2025-07-10 ENCOUNTER — PROCEDURE VISIT (OUTPATIENT)
Dept: NEUROLOGY | Facility: CLINIC | Age: 75
End: 2025-07-10
Payer: MEDICARE

## 2025-07-10 DIAGNOSIS — G24.3 CERVICAL DYSTONIA: Primary | ICD-10-CM

## 2025-07-10 NOTE — PROGRESS NOTES
BOTOX PROCEDURE NOTE     Date of Procedure: 7/10/2025    Reason for Procedure: Cervical Dystonia    Botox continues to be very effective for tremor and pain control. No side effects. He is now post-op for his abdominal hernia which went well. He has lost some weight with that surgery. Has some difficulties with his right hand that he believes is orthopedic. He is also noticing more word finding difficulties and memory decline. He state that at times he cannot see things right in front of him.     No constipation, no anosmia, sometimes active dreams but talking.    He had a bit of slowing on the right hand and foot (but he also has orthopedic issues involving the right hip and knee)       Initial  Injection Pattern From Prior Provider:  R Splenius capitus - 30 units x 2 (60 units)  R Levator Scapula - 20 units x 2 (40 units)  R Longissimus - 25 units  R Rhomboid -15 units  Trapezius - 10 units x   4 on the right and 10 units x 2 on the left (60 units)  200 units total - patient reported short duration of action and felt under medicated.   300 units ordered for today.     Informed consent was obtained prior to performing this study. Two patient identifiers were confirmed with the patient prior to performing this study. A time out to determine correct patient and and agreement on procedure performed was conducted prior to the injections.     Procedure Details: After informed consent obtained the patient's neck was cleansed with alcohol rub and 300 Units of Botox (diluted 1:1) was injected in the following muscles:     Muscle Left Right   Semispinalis Capitis 5 5   Rhomboid 15 (over 3 sites) 5   Trapezius 50 (over 5 sites - around incision) 30 (over 3 sites)   Splenius capitus 30 (over 2 sites) 30 (over 2 sites)   Longissimus 30 20   Levator Scapulae 40 40        TOTAL 150 150       Total = 300 units  Wasted = 0 units     Will let me know if we needed extra botox for more left rhomboid area before next visit.     The  patient tolerated the procedure well with no more than 1cc of blood loss. He was observed for several minutes post injection and [facility] regarding when and where to seek help if side effects are experienced.    RTC in 3 months for additional botox treatment.     Lidia Underwood MD  Neurology

## 2025-07-31 ENCOUNTER — HOSPITAL ENCOUNTER (OUTPATIENT)
Dept: RADIOLOGY | Facility: HOSPITAL | Age: 75
Discharge: HOME | End: 2025-07-31
Attending: PHYSICIAN ASSISTANT
Payer: MEDICARE

## 2025-07-31 ENCOUNTER — OFFICE VISIT (OUTPATIENT)
Dept: SPORTS MEDICINE | Facility: CLINIC | Age: 75
End: 2025-07-31
Payer: MEDICARE

## 2025-07-31 VITALS
DIASTOLIC BLOOD PRESSURE: 60 MMHG | WEIGHT: 161.63 LBS | SYSTOLIC BLOOD PRESSURE: 91 MMHG | BODY MASS INDEX: 25.37 KG/M2 | HEIGHT: 67 IN | HEART RATE: 69 BPM

## 2025-07-31 DIAGNOSIS — G89.29 CHRONIC RIGHT SHOULDER PAIN: Primary | ICD-10-CM

## 2025-07-31 DIAGNOSIS — M75.121 NONTRAUMATIC COMPLETE TEAR OF RIGHT ROTATOR CUFF: ICD-10-CM

## 2025-07-31 DIAGNOSIS — M25.512 CHRONIC LEFT SHOULDER PAIN: ICD-10-CM

## 2025-07-31 DIAGNOSIS — M19.011 GLENOHUMERAL ARTHRITIS, RIGHT: ICD-10-CM

## 2025-07-31 DIAGNOSIS — M75.102 ROTATOR CUFF SYNDROME OF LEFT SHOULDER: ICD-10-CM

## 2025-07-31 DIAGNOSIS — M25.511 RIGHT SHOULDER PAIN, UNSPECIFIED CHRONICITY: ICD-10-CM

## 2025-07-31 DIAGNOSIS — M25.511 CHRONIC RIGHT SHOULDER PAIN: Primary | ICD-10-CM

## 2025-07-31 DIAGNOSIS — G89.29 CHRONIC LEFT SHOULDER PAIN: ICD-10-CM

## 2025-07-31 PROCEDURE — 73030 X-RAY EXAM OF SHOULDER: CPT | Mod: TC,RT

## 2025-07-31 PROCEDURE — 73030 X-RAY EXAM OF SHOULDER: CPT | Mod: 26,RT,, | Performed by: RADIOLOGY

## 2025-07-31 PROCEDURE — 99999 PR PBB SHADOW E&M-EST. PATIENT-LVL III: CPT | Mod: PBBFAC,,, | Performed by: PHYSICIAN ASSISTANT

## 2025-07-31 RX ORDER — TRIAMCINOLONE ACETONIDE 40 MG/ML
40 INJECTION, SUSPENSION INTRA-ARTICULAR; INTRAMUSCULAR
Status: DISCONTINUED | OUTPATIENT
Start: 2025-07-31 | End: 2025-07-31 | Stop reason: HOSPADM

## 2025-07-31 RX ADMIN — TRIAMCINOLONE ACETONIDE 40 MG: 40 INJECTION, SUSPENSION INTRA-ARTICULAR; INTRAMUSCULAR at 02:07

## 2025-07-31 NOTE — PROCEDURES
Large Joint Aspiration/Injection: bilateral subacromial bursa    Date/Time: 7/31/2025 2:30 PM    Performed by: Bruce Mccray PA-C  Authorized by: Bruce Mccray PA-C    Site marked: the procedure site was marked    Timeout: prior to procedure the correct patient, procedure, and site was verified    Prep: patient was prepped and draped in usual sterile fashion    Local anesthesia used?: No    Local anesthetic:  Topical anesthetic    Details:  Needle Size:  22 G  Ultrasonic Guidance for needle placement?: No    Approach:  Posterior  Location:  Shoulder  Laterality:  Bilateral  Site:  Bilateral subacromial bursa  Medications (Right):  40 mg triamcinolone acetonide 40 mg/mL  Medications (Left):  40 mg triamcinolone acetonide 40 mg/mL  Patient tolerance:  Patient tolerated the procedure well with no immediate complications    Injection Procedure  A time out was performed, including verification of patient ID, procedure, site and side, availability of information and equipment, review of safety issues, and agreement with consent, the procedure site was marked.    After time out was performed, the patient was prepped aseptically with povidone-iodine swabsticks. A diagnostic and therapeutic injection of 1:3cc Kenalog/Marcaine was given under sterile technique using a 22g x 1.5 needle from the Posterior  aspect of the bilateral Subacromial in the sitting position.      Gerald Tiwari Jr. had no adverse reactions to the medication. Pain decreased. He was instructed to apply ice to the joint for 20 minutes and avoid strenuous activities for 24-36 hours following the injection. He was warned of possible blood sugar and/or blood pressure changes during that time. Following that time, he can resume regular activities.    He was reminded to call the clinic immediately for any adverse side effects as explained in clinic today.

## 2025-07-31 NOTE — PROGRESS NOTES
CC: BILATERAL shoulder pain    Patient returns to clinic today for follow-up evaluation of bilateral shoulder pain.  He denies any new injuries or trauma to the left shoulder since his last visit.  He has been seen and treated by me for this issue since June of 2024.  He has known glenohumeral osteoarthritis of both shoulders and a chronic rotator cuff tear of the right shoulder with a suspected left rotator cuff tear.  He is aware that he is not a good surgical candidate, therefore we are exhausting conservative management options.  He has done well with subacromial corticosteroid injections in the past, and states that these typically provide relief for 3-4 months.  Here today for repeat bilateral corticosteroid injections.  He did have an episode yesterday where his right shoulder spontaneously started having severe pain without any injury or trauma associated with this.  This has started to gradually subside today.    (06/04/2024):  Patient is a 73-year-old male with a history of torticollis/cervical dystonia who presents today for initial evaluation of right shoulder pain as a new patient to me.  Previously seen by Dr. [place] for this issue sometime last year.  Ultimately he underwent an MRI last February which showed a retracted full-thickness rotator cuff tear with associated muscle atrophy. Patient reports approximately 2 weeks ago he experienced pain in his right arm/biceps, which occurred during sleep. This resulted in a lump that eventually resolved, but the deep constant ache remained. He mentions a few days after he was digging a ditch outside his home and the pain worsened that evening. Patient states he will get about 2-3 hours of sleep each night and only total rest and no movement of the right arm will help. He is in severe pain while washing dishes, typing emails, or using the TV remote, and relies on his left arm for most activities of daily life. Patient reports taking tylenol to help  alleviate the pain. Patient denies any numbness or tingling.  He has also been wearing a wrist splint for a separate wrist injury that he is currently being treated for.    He does have a history of arthroscopic rotator cuff repair of the right shoulder approximately 5-10 years ago by an outside orthopedist.    Is affecting ADLs.  Pain is 8/10 at it's worst.      Past Medical History:   Diagnosis Date    Aortic dissection distal to left subclavian     Back pain     Benign essential hypertension 6/6/2014    Chronic pain     Coronary artery disease     CVA (cerebral infarction) 06/04/2014    Elevated cholesterol     Hernia     Hx of CABG      Hypertension     Neck pain     s/p neck surgery    Stroke     Tremors of nervous system        Past Surgical History:   Procedure Laterality Date    ABDOMINAL SURGERY      spleenectomy    arm surgery      BACK SURGERY      LEG SURGERY      NECK SURGERY      SHOULDER SURGERY         No family history on file.      Current Outpatient Medications:     acetaminophen (TYLENOL) 325 MG tablet, Take 325 mg by mouth every 6 (six) hours as needed for Pain., Disp: , Rfl:     potassium chloride (MICRO-K) 10 MEQ CpSR, Take 10 mEq by mouth once., Disp: , Rfl:     folic acid 0.8 mg Cap, Take by mouth., Disp: , Rfl:     methocarbamoL (ROBAXIN) 750 MG Tab, Take 1,500 mg by mouth 3 (three) times daily., Disp: , Rfl:     ranolazine (RANEXA) 500 MG Tb12, Take 1 tablet (500 mg total) by mouth 2 (two) times daily. (Patient not taking: Reported on 7/31/2025), Disp: 180 tablet, Rfl: 3    Current Facility-Administered Medications:     onabotulinumtoxina injection 300 Units, 300 Units, Intramuscular, 1 time in Clinic/HOD,     Review of patient's allergies indicates:  No Known Allergies       REVIEW OF SYSTEMS:  Constitution: Negative. Negative for chills, fever and night sweats.   HENT: Negative for congestion and headaches.    Eyes: Negative for blurred vision, left vision loss and right vision loss.  "  Cardiovascular: Negative for chest pain and syncope.   Respiratory: Negative for cough and shortness of breath.    Endocrine: Negative for polydipsia, polyphagia and polyuria.   Hematologic/Lymphatic: Negative for bleeding problem. Does not bruise/bleed easily.   Skin: Negative for dry skin, itching and rash.   Musculoskeletal: Negative for falls.  Positive for bilateral shoulder pain and muscle weakness.   Gastrointestinal: Negative for abdominal pain and bowel incontinence.   Genitourinary: Negative for bladder incontinence and nocturia.   Neurological: Negative for disturbances in coordination, loss of balance and seizures.   Psychiatric/Behavioral: Negative for depression. The patient does not have insomnia.    Allergic/Immunologic: Negative for hives and persistent infections.      PHYSICAL EXAMINATION:  Vitals:  BP 91/60   Pulse 69   Ht 5' 7" (1.702 m)   Wt 73.3 kg (161 lb 9.6 oz)   BMI 25.31 kg/m²    General: The patient is alert and oriented x 3.  Mood is pleasant.  Observation of ears, eyes and nose reveal no gross abnormalities.  No labored breathing observed.  Gait is coordinated. Patient can toe walk and heel walk without difficulty.      BILATERAL SHOULDER / UPPER EXTREMITY EXAM    OBSERVATION:     Swelling  none  Deformity  none   Discoloration  none   Scapular winging none   Scars   none  Atrophy  moderate    TENDERNESS / CREPITUS (T/C):          T/C      T/C   Clavicle   -/-  SUPRAspinatus    +/-     AC Jt.    +/-  INFRAspinatus  +/-    SC Jt.    -/-  Deltoid    -/-      G. Tuberosity  +/-  LH BICEP groove  +/-   Acromion:  -/-  Midline Neck   -/-     Scapular Spine -/-  Trapezium   -/-   SMA Scapula  -/-  GH jt. line - post  +/-     Scapulothoracic  -/-         ROM: (* = with pain)  Left shoulder   Right shoulder         AROM (PROM)   AROM (PROM)   FE    150°* (165°*)     110°* (120°*)     ER at 0°    50°*  (55°*)    30°*  (40*)   IR (spine level)   T10     Sacrum*    STRENGTH: (* = with " pain) Left shoulder   Right shoulder   SCAPTION   4/5*    3/5*    IR    4+/5    4/5*   ER    4/5*    4/5*   BICEPS   4/5*    3/5*   Deltoid    5/5    3/5*     SIGNS:  *special testing deferred secondary to pain*    STABILITY TESTING    Left shoulder   Right shoulder   Translation    Anterior  up face     up face   Posterior  up face    up face   Sulcus   < 10mm    < 10 mm     Signs   Apprehension   neg      neg       Relocation   no change     no change      Jerk test  neg     neg    EXTREMITY NEURO-VASCULAR EXAM:    Sensation grossly intact to light touch all dermatomal regions.    DTR 2+ Biceps, Triceps, BR and Negative Chens sign   Grossly intact motor function at Elbow, Wrist and Hand   Distal pulses radial and ulnar 2+, brisk cap refill, symmetric.      NECK:  Painless FROM and spinous processes non-tender. Negative Spurlings sign.      OTHER FINDINGS:  none    MRI right shoulder, external (February 2023):  Impression    1. Full-thickness tendon tear of the supraspinatus tendon with tendon retraction or muscle atrophy.  2. Mid-high grade partial-thickness tear through the anterior infraspinatus tendon, with mild muscle atrophy.  3. Mild to moderate tendinosis of the subscapularis with partial-thickness tearing/fraying to the cephalad fibers.  4. Moderate to severe osteoarthrosis at the acromioclavicular and glenohumeral joints with elevation of the humeral head.  5. Large subacromial/subdeltoid fluid collection and small to moderate overall shoulder joint effusion.      XRAYS right shoulder (6/4/2024):  Xrays including AP, Outlet and Axillary Lateral of shoulder are ordered / images reviewed by me:  No acute fracture or dislocation.  Moderate glenohumeral and acromioclavicular joint DJD.  Soft tissues appear normal.  Sternotomy wires in place without any visible complication.        ASSESSMENT:     1. Chronic right shoulder pain  X-ray Shoulder 2 or More Views Right      2. Nontraumatic complete tear of  right rotator cuff        3. Glenohumeral arthritis, right        4. Chronic left shoulder pain        5. Rotator cuff syndrome of left shoulder                PLAN:      Prior imaging reviewed and discussed with patient in detail.    We again discussed at length different treatment options including conservative vs surgical management. These include anti-inflammatories, acetaminophen, rest, ice, heat, formal physical therapy including strengthening and stretching exercises, home exercise programs, dry needling, corticosteroid injections, and finally surgical intervention.      Patient would like to continue conservative treatment at this time.    Bilateral subacromial corticosteroid injections performed today.  See procedure note for details.    Continue conservative treatment home including over-the-counter extra-strength Tylenol as needed for pain.  Continue warm/cool compresses and a regular home exercise program.    Follow up as needed.      All questions were answered, patient will contact us for questions or concerns in the interim.        Medical Dictation software was used during the dictation of portions or the entirety of this medical record.  Phonetic or grammatic errors may exist due to the use of this software. For clarification, refer to the author of the document.

## 2025-08-14 ENCOUNTER — OFFICE VISIT (OUTPATIENT)
Dept: SPORTS MEDICINE | Facility: CLINIC | Age: 75
End: 2025-08-14
Payer: MEDICARE

## 2025-08-14 ENCOUNTER — HOSPITAL ENCOUNTER (OUTPATIENT)
Dept: RADIOLOGY | Facility: HOSPITAL | Age: 75
Discharge: HOME | End: 2025-08-14
Attending: PHYSICIAN ASSISTANT
Payer: MEDICARE

## 2025-08-14 VITALS
HEART RATE: 65 BPM | SYSTOLIC BLOOD PRESSURE: 152 MMHG | BODY MASS INDEX: 24.38 KG/M2 | DIASTOLIC BLOOD PRESSURE: 81 MMHG | WEIGHT: 155.63 LBS

## 2025-08-14 DIAGNOSIS — G89.29 CHRONIC PAIN OF RIGHT KNEE: Primary | ICD-10-CM

## 2025-08-14 DIAGNOSIS — M17.31 POST-TRAUMATIC OSTEOARTHRITIS OF RIGHT KNEE: ICD-10-CM

## 2025-08-14 DIAGNOSIS — M25.569 KNEE PAIN, UNSPECIFIED CHRONICITY, UNSPECIFIED LATERALITY: ICD-10-CM

## 2025-08-14 DIAGNOSIS — M25.561 CHRONIC PAIN OF RIGHT KNEE: Primary | ICD-10-CM

## 2025-08-14 PROCEDURE — 99214 OFFICE O/P EST MOD 30 MIN: CPT | Mod: 25,S$GLB,, | Performed by: PHYSICIAN ASSISTANT

## 2025-08-14 PROCEDURE — 3288F FALL RISK ASSESSMENT DOCD: CPT | Mod: CPTII,S$GLB,, | Performed by: PHYSICIAN ASSISTANT

## 2025-08-14 PROCEDURE — 3079F DIAST BP 80-89 MM HG: CPT | Mod: CPTII,S$GLB,, | Performed by: PHYSICIAN ASSISTANT

## 2025-08-14 PROCEDURE — 73564 X-RAY EXAM KNEE 4 OR MORE: CPT | Mod: TC,50

## 2025-08-14 PROCEDURE — 1101F PT FALLS ASSESS-DOCD LE1/YR: CPT | Mod: CPTII,S$GLB,, | Performed by: PHYSICIAN ASSISTANT

## 2025-08-14 PROCEDURE — 1125F AMNT PAIN NOTED PAIN PRSNT: CPT | Mod: CPTII,S$GLB,, | Performed by: PHYSICIAN ASSISTANT

## 2025-08-14 PROCEDURE — 73564 X-RAY EXAM KNEE 4 OR MORE: CPT | Mod: 26,RT,, | Performed by: RADIOLOGY

## 2025-08-14 PROCEDURE — 3077F SYST BP >= 140 MM HG: CPT | Mod: CPTII,S$GLB,, | Performed by: PHYSICIAN ASSISTANT

## 2025-08-14 PROCEDURE — 20610 DRAIN/INJ JOINT/BURSA W/O US: CPT | Mod: RT,S$GLB,, | Performed by: PHYSICIAN ASSISTANT

## 2025-08-14 PROCEDURE — 1160F RVW MEDS BY RX/DR IN RCRD: CPT | Mod: CPTII,S$GLB,, | Performed by: PHYSICIAN ASSISTANT

## 2025-08-14 PROCEDURE — 1159F MED LIST DOCD IN RCRD: CPT | Mod: CPTII,S$GLB,, | Performed by: PHYSICIAN ASSISTANT

## 2025-08-14 PROCEDURE — 73564 X-RAY EXAM KNEE 4 OR MORE: CPT | Mod: 26,LT,, | Performed by: RADIOLOGY

## 2025-08-14 PROCEDURE — 99999 PR PBB SHADOW E&M-EST. PATIENT-LVL III: CPT | Mod: PBBFAC,,, | Performed by: PHYSICIAN ASSISTANT

## 2025-08-14 PROCEDURE — 3008F BODY MASS INDEX DOCD: CPT | Mod: CPTII,S$GLB,, | Performed by: PHYSICIAN ASSISTANT

## 2025-08-14 RX ORDER — TRIAMCINOLONE ACETONIDE 40 MG/ML
40 INJECTION, SUSPENSION INTRA-ARTICULAR; INTRAMUSCULAR
Status: DISCONTINUED | OUTPATIENT
Start: 2025-08-14 | End: 2025-08-14 | Stop reason: HOSPADM

## 2025-08-14 RX ADMIN — TRIAMCINOLONE ACETONIDE 40 MG: 40 INJECTION, SUSPENSION INTRA-ARTICULAR; INTRAMUSCULAR at 09:08
